# Patient Record
Sex: FEMALE | Race: BLACK OR AFRICAN AMERICAN | Employment: PART TIME | URBAN - METROPOLITAN AREA
[De-identification: names, ages, dates, MRNs, and addresses within clinical notes are randomized per-mention and may not be internally consistent; named-entity substitution may affect disease eponyms.]

---

## 2017-04-03 ENCOUNTER — HOSPITAL ENCOUNTER (OUTPATIENT)
Dept: LAB | Age: 32
Discharge: HOME | End: 2017-04-03
Payer: MEDICAID

## 2017-04-03 PROCEDURE — 88175 CYTOPATH C/V AUTO FLUID REDO: CPT | Performed by: OBSTETRICS & GYNECOLOGY

## 2017-05-11 ENCOUNTER — HOSPITAL ENCOUNTER (OUTPATIENT)
Dept: LAB | Age: 32
Discharge: HOME | End: 2017-05-11

## 2017-05-11 ENCOUNTER — OFFICE VISIT (OUTPATIENT)
Dept: FAMILY MEDICINE CLINIC | Age: 32
End: 2017-05-11

## 2017-05-11 VITALS
HEART RATE: 80 BPM | HEIGHT: 65 IN | TEMPERATURE: 99.2 F | OXYGEN SATURATION: 95 % | RESPIRATION RATE: 18 BRPM | BODY MASS INDEX: 31.75 KG/M2 | SYSTOLIC BLOOD PRESSURE: 115 MMHG | DIASTOLIC BLOOD PRESSURE: 73 MMHG | WEIGHT: 190.6 LBS

## 2017-05-11 DIAGNOSIS — G43.009 MIGRAINE WITHOUT AURA AND WITHOUT STATUS MIGRAINOSUS, NOT INTRACTABLE: ICD-10-CM

## 2017-05-11 DIAGNOSIS — L94.0 MORPHEA: ICD-10-CM

## 2017-05-11 DIAGNOSIS — F32.5 MAJOR DEPRESSIVE DISORDER WITH SINGLE EPISODE, IN FULL REMISSION (HCC): Primary | ICD-10-CM

## 2017-05-11 PROCEDURE — 99001 SPECIMEN HANDLING PT-LAB: CPT | Performed by: INTERNAL MEDICINE

## 2017-05-11 RX ORDER — SUMATRIPTAN 50 MG/1
50 TABLET, FILM COATED ORAL
Qty: 10 TAB | Refills: 2 | Status: SHIPPED | OUTPATIENT
Start: 2017-05-11 | End: 2017-08-29 | Stop reason: SDUPTHER

## 2017-05-11 RX ORDER — CALCIPOTRIENE 50 UG/G
CREAM TOPICAL 2 TIMES DAILY
Qty: 60 G | Refills: 1 | Status: SHIPPED | OUTPATIENT
Start: 2017-05-11

## 2017-05-11 RX ORDER — SERTRALINE HYDROCHLORIDE 100 MG/1
100 TABLET, FILM COATED ORAL DAILY
Qty: 30 TAB | Refills: 3 | Status: SHIPPED | OUTPATIENT
Start: 2017-05-11 | End: 2017-07-20 | Stop reason: SDUPTHER

## 2017-05-11 NOTE — PROGRESS NOTES
Patient needs refill on imitrex and sertraline    1. Have you been to the ER, urgent care clinic since your last visit? Hospitalized since your last visit? No    2. Have you seen or consulted any other health care providers outside of the [address] [place] [address] since your last visit? Include any pap smears or colon screening.  No
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
## 2017-05-11 NOTE — MR AVS SNAPSHOT
Visit Information Date & Time Provider Department Dept. Phone Encounter #  
 5/11/2017  9:45 AM [id number], [address] [facility] [phone] [id number] Follow-up Instructions Return in about 3 months (around 8/11/2017). Upcoming Health Maintenance Date Due INFLUENZA AGE 9 TO ADULT 8/1/2017 PAP AKA CERVICAL CYTOLOGY 4/3/2020 DTaP/Tdap/Td series (2 - Td) 6/6/2026 Allergies as of 5/11/2017  Review Complete On: 5/11/2017 By: [id number] STEPHEN Marcum MD  
 No Known Allergies Current Immunizations  Reviewed on 12/18/2012 Name Date Influenza Vaccine (Quad) PF 12/20/2016 Influenza Vaccine Split 10/18/2012 Influenza Vaccine Whole 11/21/2011  3:03 PM  
  
 Not reviewed this visit You Were Diagnosed With   
  
 Codes Comments Major depressive disorder with single episode, in full remission ([facility]    -  Primary ICD-10-CM: F32.5 ICD-9-CM: 296.26 Migraine without aura and without status migrainosus, not intractable     ICD-10-CM: N71.949 ICD-9-CM: 346.10 Morphea     ICD-10-CM: L94.0 ICD-9-CM: 701.0 Vitals BP Pulse Temp Resp Height(growth percentile) Weight(growth percentile) 115/73 80 99.2 °F (37.3 °C) (Oral) 18 5' 5\" (1.651 m) 190 lb 9.6 oz (86.5 kg) LMP SpO2 BMI OB Status Smoking Status 04/19/2017 95% 31.72 kg/m2 Having regular periods Never Smoker Vitals History BMI and BSA Data Body Mass Index Body Surface Area 31.72 kg/m 2 1.99 m 2 Preferred Pharmacy Pharmacy Name Phone [facility] - [address], [address] [phone] Your Updated Medication List  
  
   
This list is accurate as of: 5/11/17 10:23 AM.  Always use your most recent med list.  
  
  
  
  
 calcipotriene 0.005 % topical cream  
Commonly known as:  [place] Apply  to affected area two (2) times a day. cetirizine 10 mg tablet Commonly known as:  ZYRTEC Take 1 Tab by mouth nightly. cyclobenzaprine 10 mg tablet Commonly known as:  FLEXERIL Take 1 Tab by mouth three (3) times daily as needed for Muscle Spasm(s). NEXPLANON 68 mg Impl Generic drug:  etonogestrel  
by SubDERmal route. sertraline 100 mg tablet Commonly known as:  ZOLOFT Take 1 Tab by mouth daily. SUMAtriptan 50 mg tablet Commonly known as:  IMITREX Take 1 Tab by mouth once as needed for Migraine for up to 1 dose. Prescriptions Sent to Pharmacy Refills SUMAtriptan (IMITREX) 50 mg tablet 2 Sig: Take 1 Tab by mouth once as needed for Migraine for up to 1 dose. Class: Normal  
 Pharmacy: [facility] [address] [employer] Ph #: [phone] Route: Oral  
 sertraline (ZOLOFT) 100 mg tablet 3 Sig: Take 1 Tab by mouth daily. Class: Normal  
 Pharmacy: [facility] [address] [employer] [employer] [address] Ph #: [phone] Route: Oral  
 calcipotriene (DOVONEX) 0.005 % topical cream 1 Sig: Apply  to affected area two (2) times a day. Class: Normal  
 Pharmacy: [facility] [address] [facility] [employer] [address] Ph #: [phone] Route: Topical  
  
Follow-up Instructions Return in about 3 months (around 8/11/2017). To-Do List   
 05/11/2017 Lab:  LYRIC CAICEDO/ELENA CASCADE Introducing \[facility] [place] [facility]\"" & [facility] SERVICES! Dear Tamera Huh: 
Thank you for requesting a Ark account. Our records indicate that you already have an active Ark account. You can access your account anytime at [email or web address] Did you know that you can access your hospital and ER discharge instructions at any time in Ark? You can also review all of your test results from your hospital stay or ER visit. Additional Information If you have questions, please visit the Frequently Asked Questions section of the Ark website at [email or web address] Remember, Ark is NOT to be used for urgent needs.  For medical emergencies, dial 911. Now available from your iPhone and Android! Please provide this summary of care documentation to your next provider. Your primary care clinician is listed as Christa Iqbal. If you have any questions after today's visit, please call [phone].

## 2017-05-12 LAB
ANA SER QL: POSITIVE
DSDNA AB SER-ACNC: <1 IU/ML (ref 0–9)
ENA SM AB SER-ACNC: <0.2 AI (ref 0–0.9)
ENA SM+RNP AB SER-ACNC: 2 AI (ref 0–0.9)
SEE BELOW:, 164879: ABNORMAL

## 2017-05-15 ENCOUNTER — TELEPHONE (OUTPATIENT)
Dept: FAMILY MEDICINE CLINIC | Age: 32
End: 2017-05-15

## 2017-05-15 NOTE — LETTER
5/17/2017 12:35 PM 
 
Ms. Cortes [facility] [address] [facility] [place] [facility] [address] Dear Ms. Silver: The results of your lab work performed in our office were abnormal and we have had difficulty reaching you by telephone. Please contact our office as soon as possible to discuss these results.  
 
 
 
Sincerely, 
 
 
[id number] MD

## 2017-07-20 DIAGNOSIS — F32.5 MAJOR DEPRESSIVE DISORDER WITH SINGLE EPISODE, IN FULL REMISSION (HCC): ICD-10-CM

## 2017-07-20 NOTE — TELEPHONE ENCOUNTER
Requested Prescriptions     Pending Prescriptions Disp Refills    sertraline (ZOLOFT) 100 mg tablet 30 Tab 3     Sig: Take 1 Tab by mouth daily.      Last office visit: 5/11/17  Next office visit: not scheduled

## 2017-07-21 RX ORDER — SERTRALINE HYDROCHLORIDE 100 MG/1
100 TABLET, FILM COATED ORAL DAILY
Qty: 30 TAB | Refills: 1 | Status: SHIPPED | OUTPATIENT
Start: 2017-07-21 | End: 2017-12-05 | Stop reason: SDUPTHER

## 2017-08-29 DIAGNOSIS — G43.009 MIGRAINE WITHOUT AURA AND WITHOUT STATUS MIGRAINOSUS, NOT INTRACTABLE: ICD-10-CM

## 2017-08-29 DIAGNOSIS — J30.1 ALLERGIC RHINITIS DUE TO POLLEN, UNSPECIFIED RHINITIS SEASONALITY: ICD-10-CM

## 2017-08-31 RX ORDER — CETIRIZINE HCL 10 MG
10 TABLET ORAL
Qty: 30 TAB | Refills: 3 | Status: SHIPPED | OUTPATIENT
Start: 2017-08-31

## 2017-08-31 RX ORDER — SUMATRIPTAN 50 MG/1
50 TABLET, FILM COATED ORAL
Qty: 10 TAB | Refills: 2 | Status: SHIPPED | OUTPATIENT
Start: 2017-08-31 | End: 2018-02-08 | Stop reason: DRUGHIGH

## 2017-10-03 ENCOUNTER — DOCUMENTATION ONLY (OUTPATIENT)
Dept: FAMILY MEDICINE CLINIC | Age: 32
End: 2017-10-03

## 2017-12-05 ENCOUNTER — OFFICE VISIT (OUTPATIENT)
Dept: FAMILY MEDICINE CLINIC | Age: 32
End: 2017-12-05

## 2017-12-05 VITALS
TEMPERATURE: 97.5 F | RESPIRATION RATE: 14 BRPM | BODY MASS INDEX: 30.42 KG/M2 | HEART RATE: 81 BPM | OXYGEN SATURATION: 99 % | HEIGHT: 65 IN | SYSTOLIC BLOOD PRESSURE: 117 MMHG | DIASTOLIC BLOOD PRESSURE: 71 MMHG | WEIGHT: 182.6 LBS

## 2017-12-05 DIAGNOSIS — F32.5 MAJOR DEPRESSIVE DISORDER WITH SINGLE EPISODE, IN FULL REMISSION (HCC): ICD-10-CM

## 2017-12-05 DIAGNOSIS — M25.512 LEFT SHOULDER PAIN, UNSPECIFIED CHRONICITY: Primary | ICD-10-CM

## 2017-12-05 DIAGNOSIS — R76.8 POSITIVE ANA (ANTINUCLEAR ANTIBODY): ICD-10-CM

## 2017-12-05 RX ORDER — CYCLOBENZAPRINE HCL 10 MG
10 TABLET ORAL
Qty: 20 TAB | Refills: 0 | Status: SHIPPED | OUTPATIENT
Start: 2017-12-05 | End: 2018-05-30 | Stop reason: SDUPTHER

## 2017-12-05 RX ORDER — SERTRALINE HYDROCHLORIDE 100 MG/1
100 TABLET, FILM COATED ORAL DAILY
Qty: 30 TAB | Refills: 3 | Status: SHIPPED | OUTPATIENT
Start: 2017-12-05

## 2017-12-05 NOTE — PROGRESS NOTES
1. Have you been to the ER, urgent care clinic since your last visit? Hospitalized since your last visit? No    2. Have you seen or consulted any other health care providers outside of the [address] [place] [address] since your last visit? Include any pap smears or colon screening.  No

## 2017-12-05 NOTE — MR AVS SNAPSHOT
Visit Information Date & Time Provider Department Dept. Phone Encounter #  
 12/5/2017  1:00 PM [id number], [address] [facility] [phone] [id number] Follow-up Instructions Return in about 3 months (around 3/5/2018). Upcoming Health Maintenance Date Due  
 PAP AKA CERVICAL CYTOLOGY 4/3/2020 DTaP/Tdap/Td series (2 - Td) 6/6/2026 Allergies as of 12/5/2017  Review Complete On: 12/5/2017 By: Ethel Moore LPN No Known Allergies Current Immunizations  Reviewed on 12/18/2012 Name Date Influenza Vaccine (Quad) PF 12/20/2016 Influenza Vaccine Split 10/18/2012 Influenza Vaccine Whole 11/21/2011  3:03 PM  
  
 Not reviewed this visit You Were Diagnosed With   
  
 Codes Comments Left shoulder pain, unspecified chronicity    -  Primary ICD-10-CM: M25.512 ICD-9-CM: 719.41 Major depressive disorder with single episode, in full remission ([facility]     ICD-10-CM: F32.5 ICD-9-CM: 296.26 Positive JUAN ALBERTO (antinuclear antibody)     ICD-10-CM: R76.8 ICD-9-CM: 795.79 Vitals BP Pulse Temp Resp Height(growth percentile) Weight(growth percentile) 117/71 81 97.5 °F (36.4 °C) (Oral) 14 5' 5\" (1.651 m) 182 lb 9.6 oz (82.8 kg) LMP SpO2 BMI OB Status Smoking Status 11/21/2017 99% 30.39 kg/m2 Implant Never Smoker Vitals History BMI and BSA Data Body Mass Index Body Surface Area  
 30.39 kg/m 2 1.95 m 2 Preferred Pharmacy Pharmacy Name Phone [facility] - [address], [address] [phone] Your Updated Medication List  
  
   
This list is accurate as of: 12/5/17  1:35 PM.  Always use your most recent med list.  
  
  
  
  
 calcipotriene 0.005 % topical cream  
Commonly known as:  [place] Apply  to affected area two (2) times a day. cetirizine 10 mg tablet Commonly known as:  ZYRTEC Take 1 Tab by mouth nightly. * cyclobenzaprine 10 mg tablet Commonly known as:  FLEXERIL Take 1 Tab by mouth three (3) times daily as needed for Muscle Spasm(s). * cyclobenzaprine 10 mg tablet Commonly known as:  FLEXERIL Take 1 Tab by mouth three (3) times daily as needed for Muscle Spasm(s). NEXPLANON 68 mg Impl Generic drug:  etonogestrel  
by SubDERmal route. sertraline 100 mg tablet Commonly known as:  ZOLOFT Take 1 Tab by mouth daily. SUMAtriptan 50 mg tablet Commonly known as:  IMITREX Take 1 Tab by mouth once as needed for Migraine for up to 1 dose. * Notice: This list has 2 medication(s) that are the same as other medications prescribed for you. Read the directions carefully, and ask your doctor or other care provider to review them with you. Prescriptions Sent to Pharmacy Refills  
 sertraline (ZOLOFT) 100 mg tablet 3 Sig: Take 1 Tab by mouth daily. Class: Normal  
 Pharmacy: [facility], [address] Ph #: [phone] Route: Oral  
 cyclobenzaprine (FLEXERIL) 10 mg tablet 0 Sig: Take 1 Tab by mouth three (3) times daily as needed for Muscle Spasm(s). Class: Normal  
 Pharmacy: [facility], [address] [employer] Ph #: [phone] Route: Oral  
  
We Performed the Following REFERRAL TO RHEUMATOLOGY [HIF05 Custom] Follow-up Instructions Return in about 3 months (around 3/5/2018). Referral Information Referral ID Referred By Referred To  
  
 [id number] Galo ISAAC Not Available Visits Status Start Date End Date 1 New Request 12/5/17 12/5/18 If your referral has a status of pending review or denied, additional information will be sent to support the outcome of this decision. Introducing [address]! Dear Darryle [place]: 
Thank you for requesting a Aliveshoes account. Our records indicate that you already have an active Aliveshoes account.   You can access your account anytime at [email or web address] Did you know that you can access your hospital and ER discharge instructions at any time in [address]? You can also review all of your test results from your hospital stay or ER visit. Additional Information If you have questions, please visit the Frequently Asked Questions section of the [facility] website at [email or web address] Remember, [facility] is NOT to be used for urgent needs. For medical emergencies, dial 911. Now available from your iPhone and Android! Please provide this summary of care documentation to your next provider. Your primary care clinician is listed as Vikash Fernandez. If you have any questions after today's visit, please call [phone].
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
## 2018-02-05 ENCOUNTER — TELEPHONE (OUTPATIENT)
Dept: FAMILY MEDICINE CLINIC | Age: 33
End: 2018-02-05

## 2018-02-05 NOTE — TELEPHONE ENCOUNTER
Patient is asking if Dr. Miguelito Vidal can increase dosage for sumatriptan (Imitrex) medication, because it is not working for her. Patient is also asking for a refill for zyrtec. Patient wants her refill to be sent at [facility] in [place]. Asking to be called if there is any issue.

## 2018-02-07 NOTE — TELEPHONE ENCOUNTER
On 02/05/2018 nurse confirmed pharmacy and pharmacy is listed correctly. [address] [facility] is the correct pharmacy. Please rx medication. Please kindly look up [address] , pharmacy in [facility], [place] [facility] is no [facility] [address]. I need the full  address, phone number of the pharmacy. Not just [address].

## 2018-02-08 DIAGNOSIS — G43.009 MIGRAINE WITHOUT AURA AND WITHOUT STATUS MIGRAINOSUS, NOT INTRACTABLE: Primary | ICD-10-CM

## 2018-02-08 RX ORDER — SUMATRIPTAN 100 MG/1
100 TABLET, FILM COATED ORAL
Qty: 6 TAB | Refills: 2 | Status: SHIPPED | OUTPATIENT
Start: 2018-02-08

## 2018-04-18 ENCOUNTER — TELEPHONE (OUTPATIENT)
Dept: FAMILY MEDICINE CLINIC | Age: 33
End: 2018-04-18

## 2018-04-18 NOTE — TELEPHONE ENCOUNTER
Janel from Dr. Kulwant Christian stated that Pt. Did not show up to appt. Even after they sent a reminder call. She stated that pt. May not be seen at [facility] due to her no show and she never made another appt. If no one can be contacted at [facility] then you can call the [address] [place] [address] office at [phone].

## 2018-04-27 ENCOUNTER — DOCUMENTATION ONLY (OUTPATIENT)
Dept: FAMILY MEDICINE CLINIC | Age: 33
End: 2018-04-27

## 2018-04-27 NOTE — LETTER
4/27/2018 [facility] [address] [place] [address] Dear Ms. James MITCHELL [facility] [address], We had an appointment reserved for you 4/17/18 and were concerned when you did not show or call within 24 hours to cancel the appointment. As mentioned in a previous letter to you, appointment time is limited and no-showed appointments may prevent another sick individual who needs to be seen from getting a preferred appointment time. Please note that a continued pattern of not showing for appointments may result in your inability to pre-book future appointments as well as possible discharge from the practice. Please call us at your earliest convenience to reschedule your appointment as your provider felt it was important to see you. Thank you for your anticipated cooperation. Sincerely, The scheduling staff [address] [place] [address] [phone]

## 2018-05-30 ENCOUNTER — OFFICE VISIT (OUTPATIENT)
Dept: FAMILY MEDICINE CLINIC | Age: 33
End: 2018-05-30

## 2018-05-30 VITALS
TEMPERATURE: 97.7 F | SYSTOLIC BLOOD PRESSURE: 117 MMHG | OXYGEN SATURATION: 92 % | BODY MASS INDEX: 30.32 KG/M2 | RESPIRATION RATE: 15 BRPM | HEART RATE: 68 BPM | WEIGHT: 182 LBS | HEIGHT: 65 IN | DIASTOLIC BLOOD PRESSURE: 75 MMHG

## 2018-05-30 DIAGNOSIS — J06.9 UPPER RESPIRATORY TRACT INFECTION, UNSPECIFIED TYPE: Primary | ICD-10-CM

## 2018-05-30 DIAGNOSIS — M25.512 LEFT SHOULDER PAIN, UNSPECIFIED CHRONICITY: ICD-10-CM

## 2018-05-30 DIAGNOSIS — R07.0 THROAT PAIN: ICD-10-CM

## 2018-05-30 RX ORDER — LIDOCAINE HYDROCHLORIDE 20 MG/ML
15 SOLUTION OROPHARYNGEAL AS NEEDED
Qty: 1 BOTTLE | Refills: 0 | Status: SHIPPED | OUTPATIENT
Start: 2018-05-30

## 2018-05-30 RX ORDER — AZITHROMYCIN 250 MG/1
TABLET, FILM COATED ORAL
Qty: 6 TAB | Refills: 0 | Status: SHIPPED | OUTPATIENT
Start: 2018-05-30 | End: 2018-06-04

## 2018-05-30 RX ORDER — BENZONATATE 100 MG/1
100 CAPSULE ORAL
Qty: 30 CAP | Refills: 1 | Status: SHIPPED | OUTPATIENT
Start: 2018-05-30 | End: 2018-06-06

## 2018-05-30 RX ORDER — CYCLOBENZAPRINE HCL 10 MG
10 TABLET ORAL
Qty: 30 TAB | Refills: 0 | Status: SHIPPED | OUTPATIENT
Start: 2018-05-30
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
## 2018-05-30 NOTE — PROGRESS NOTES
1. Have you been to the ER, urgent care clinic since your last visit? Hospitalized since your last visit? No    2. Have you seen or consulted any other health care providers outside of the [address] [place] [address] since your last visit? Include any pap smears or colon screening.  No

## 2018-05-30 NOTE — LETTER
NOTIFICATION RETURN TO WORK / SCHOOL 
 
5/30/2018 11:01 AM 
 
Ms. Aron Butterfield [address] [place] [address] To Whom It May Concern: 
 
Aron Butterfield is currently under the care of [address] [place] [address] She will return to work/school on: 5/31/2018. If there are questions or concerns please have the patient contact our office.  
 
 
 
Sincerely, 
 
 
Oksana Jc MD

## 2018-05-30 NOTE — MR AVS SNAPSHOT
[facility] 
 
 
 [address] [facility] [address] [place] [address] 83 [id number] 
[phone] Patient: Loren Walters MRN: [id number] [facility]:[id number] Visit Information Date & Time Provider Department Dept. Phone Encounter #  
 5/30/2018 10:00 AM [id number] STEPHEN Marcum, [facility] [phone] [id number] Follow-up Instructions Return in about 3 months (around 8/30/2018). Upcoming Health Maintenance Date Due Influenza Age 5 to Adult 8/1/2018 PAP AKA CERVICAL CYTOLOGY 4/3/2020 DTaP/Tdap/Td series (2 - Td) 6/6/2026 Allergies as of 5/30/2018  Review Complete On: 5/30/2018 By: Cierra Crandall LPN No Known Allergies Current Immunizations  Reviewed on 12/18/2012 Name Date Influenza Vaccine (Quad) PF 12/20/2016 Influenza Vaccine Split 10/18/2012 Influenza Vaccine Whole 11/21/2011  3:03 PM  
  
 Not reviewed this visit You Were Diagnosed With   
  
 Codes Comments Upper respiratory tract infection, unspecified type    -  Primary ICD-10-CM: J06.9 ICD-9-CM: 465.9 Throat pain     ICD-10-CM: R07.0 ICD-9-CM: 784.1 Left shoulder pain, unspecified chronicity     ICD-10-CM: M25.512 ICD-9-CM: 719.41 Vitals BP Pulse Temp Resp Height(growth percentile) Weight(growth percentile) 117/75 68 97.7 °F (36.5 °C) (Oral) 15 5' 5\" (1.651 m) 182 lb (82.6 kg) LMP SpO2 BMI OB Status Smoking Status 05/25/2018 92% 30.29 kg/m2 Implant Never Smoker Vitals History BMI and BSA Data Body Mass Index Body Surface Area  
 30.29 kg/m 2 1.95 m 2 Preferred Pharmacy Pharmacy Name Phone [facility] [place].  [address] [phone] Your Updated Medication List  
  
   
This list is accurate as of 5/30/18 11:01 AM.  Always use your most recent med list.  
  
  
  
  
 azithromycin 250 mg tablet Commonly known as:  Braydon Arora Take 2 tablets today, then take 1 tablet daily benzonatate 100 mg capsule Commonly known as:  TESSALON Take 1 Cap by mouth three (3) times daily as needed for Cough for up to 7 days. calcipotriene 0.005 % topical cream  
Commonly known as:  Collette Libel Apply  to affected area two (2) times a day. cetirizine 10 mg tablet Commonly known as:  ZYRTEC Take 1 Tab by mouth nightly. * cyclobenzaprine 10 mg tablet Commonly known as:  FLEXERIL Take 1 Tab by mouth three (3) times daily as needed for Muscle Spasm(s). * cyclobenzaprine 10 mg tablet Commonly known as:  FLEXERIL Take 1 Tab by mouth three (3) times daily as needed for Muscle Spasm(s). lidocaine 2 % solution Commonly known as:  XYLOCAINE Take 15 mL by mouth as needed for Pain. NEXPLANON 68 mg Impl Generic drug:  etonogestrel  
by SubDERmal route. sertraline 100 mg tablet Commonly known as:  ZOLOFT Take 1 Tab by mouth daily. SUMAtriptan 100 mg tablet Commonly known as:  IMITREX Take 1 Tab by mouth once as needed for Migraine for up to 1 dose. * Notice: This list has 2 medication(s) that are the same as other medications prescribed for you. Read the directions carefully, and ask your doctor or other care provider to review them with you. Prescriptions Sent to Pharmacy Refills  
 benzonatate (TESSALON) 100 mg capsule 1 Sig: Take 1 Cap by mouth three (3) times daily as needed for Cough for up to 7 days. Class: Normal  
 Pharmacy: [address] [place] [address], [facility] Ph #: [phone] Route: Oral  
 azithromycin (ZITHROMAX) 250 mg tablet 0 Sig: Take 2 tablets today, then take 1 tablet daily Class: Normal  
 Pharmacy: [address] [place] [address] Ph #: [phone]  
 lidocaine (XYLOCAINE) 2 % solution 0 Sig: Take 15 mL by mouth as needed for Pain. Class: Normal  
 Pharmacy: [address] [place] [address] I, [address] [employer] Ph #: [phone]  Route: Oral  
 cyclobenzaprine (FLEXERIL) 10 mg tablet 0 Sig: Take 1 Tab by mouth three (3) times daily as needed for Muscle Spasm(s). Class: Normal  
 Pharmacy: [address], [address]  #: [phone] Route: Oral  
  
Follow-up Instructions Return in about 3 months (around 8/30/2018). Introducing [facility]! Dear Garth Gonzalez: 
Thank you for requesting a [employer] account. Our records indicate that you already have an active [employer] account. You can access your account anytime at [email or web address] Did you know that you can access your hospital and ER discharge instructions at any time in [employer]? You can also review all of your test results from your hospital stay or ER visit. Additional Information If you have questions, please visit the Frequently Asked Questions section of the [employer] website at [email or web address] Remember, [employer] is NOT to be used for urgent needs. For medical emergencies, dial 911. Now available from your iPhone and Android! Please provide this summary of care documentation to your next provider. Your primary care clinician is listed as Malcom De La Garza. If you have any questions after today's visit, please call [phone].